# Patient Record
Sex: FEMALE | Race: WHITE | NOT HISPANIC OR LATINO | URBAN - METROPOLITAN AREA
[De-identification: names, ages, dates, MRNs, and addresses within clinical notes are randomized per-mention and may not be internally consistent; named-entity substitution may affect disease eponyms.]

---

## 2024-01-18 ENCOUNTER — APPOINTMENT (RX ONLY)
Dept: URBAN - METROPOLITAN AREA CLINIC 28 | Facility: CLINIC | Age: 74
Setting detail: DERMATOLOGY
End: 2024-01-18

## 2024-01-18 DIAGNOSIS — Z85.828 PERSONAL HISTORY OF OTHER MALIGNANT NEOPLASM OF SKIN: ICD-10-CM

## 2024-01-18 DIAGNOSIS — D22 MELANOCYTIC NEVI: ICD-10-CM

## 2024-01-18 DIAGNOSIS — L85.3 XEROSIS CUTIS: ICD-10-CM

## 2024-01-18 DIAGNOSIS — L72.8 OTHER FOLLICULAR CYSTS OF THE SKIN AND SUBCUTANEOUS TISSUE: ICD-10-CM

## 2024-01-18 DIAGNOSIS — L82.1 OTHER SEBORRHEIC KERATOSIS: ICD-10-CM

## 2024-01-18 DIAGNOSIS — L90.0 LICHEN SCLEROSUS ET ATROPHICUS: ICD-10-CM

## 2024-01-18 PROBLEM — D22.5 MELANOCYTIC NEVI OF TRUNK: Status: ACTIVE | Noted: 2024-01-18

## 2024-01-18 PROCEDURE — ? PRESCRIPTION

## 2024-01-18 PROCEDURE — 99213 OFFICE O/P EST LOW 20 MIN: CPT

## 2024-01-18 PROCEDURE — ? COUNSELING

## 2024-01-18 PROCEDURE — ? OBSERVATION

## 2024-01-18 RX ORDER — CLOBETASOL PROPIONATE 0.5 MG/G
OINTMENT TOPICAL
Qty: 30 | Refills: 0

## 2024-01-18 ASSESSMENT — LOCATION SIMPLE DESCRIPTION DERM
LOCATION SIMPLE: LEFT HAND
LOCATION SIMPLE: VESTIBULE
LOCATION SIMPLE: UPPER BACK
LOCATION SIMPLE: LEFT UPPER BACK

## 2024-01-18 ASSESSMENT — LOCATION DETAILED DESCRIPTION DERM
LOCATION DETAILED: VESTIBULE
LOCATION DETAILED: INFERIOR THORACIC SPINE
LOCATION DETAILED: LEFT SUPERIOR UPPER BACK
LOCATION DETAILED: LEFT MID-UPPER BACK
LOCATION DETAILED: LEFT ULNAR DORSAL HAND

## 2024-01-18 ASSESSMENT — LOCATION ZONE DERM
LOCATION ZONE: VAGINA
LOCATION ZONE: HAND
LOCATION ZONE: TRUNK

## 2024-01-18 NOTE — HPI: SECONDARY COMPLAINT
Additional History: Patient does not need a refill of clobetasol ointment .05%. Patient states she has plenty at home, and only uses it as needed. \\nPatient is noticing some bumps on the genitals.

## 2024-01-18 NOTE — PROCEDURE: OBSERVATION
Detail Level: Detailed
Size Of Lesion In Cm (Optional): 0.6
X Size Of Lesion In Cm (Optional): 0
Morphology Per Location (Optional): Stuck on, tan

## 2024-01-18 NOTE — PROCEDURE: COUNSELING
Patient Specific Counseling (Will Not Stick From Patient To Patient): Clinical diagnosis for patient.
Detail Level: Detailed
Size Of Lesion In Cm (Optional): 0.6
X Size Of Lesion In Cm (Optional): 0
Morphology Per Location (Optional): Stuck on, tan
Detail Level: Generalized
Detail Level: Zone

## 2024-07-17 ENCOUNTER — OFFICE VISIT (OUTPATIENT)
Dept: URBAN - METROPOLITAN AREA CLINIC 19 | Facility: CLINIC | Age: 74
End: 2024-07-17
Payer: MEDICARE

## 2024-07-17 ENCOUNTER — DASHBOARD ENCOUNTERS (OUTPATIENT)
Age: 74
End: 2024-07-17

## 2024-07-17 VITALS
HEART RATE: 99 BPM | WEIGHT: 131 LBS | DIASTOLIC BLOOD PRESSURE: 60 MMHG | BODY MASS INDEX: 21.05 KG/M2 | HEIGHT: 66 IN | TEMPERATURE: 98.6 F | SYSTOLIC BLOOD PRESSURE: 120 MMHG

## 2024-07-17 DIAGNOSIS — R10.13 EPIGASTRIC PAIN: ICD-10-CM

## 2024-07-17 DIAGNOSIS — Z12.11 SCREEN FOR COLON CANCER: ICD-10-CM

## 2024-07-17 DIAGNOSIS — K86.81 CONGENITAL DEFICIENCY OF PANCREATIC LIPASE: ICD-10-CM

## 2024-07-17 PROCEDURE — 99204 OFFICE O/P NEW MOD 45 MIN: CPT | Performed by: NURSE PRACTITIONER

## 2024-07-17 NOTE — PHYSICAL EXAM GASTROINTESTINAL
Abdomen , soft, nontender, nondistended , no guarding or rigidity , Liver and Spleen , no hepatosplenomegaly

## 2024-07-17 NOTE — HPI-TODAY'S VISIT:
74-year-old female with HLD, hypothyroidism, cholecystectomy, ovary removal presents for ER follow-up. Presented to [facility] on 7/3/2024 with epigastric pain, chest pain WBC 9.3, Hg 12.0, , caridac enzymes, troponins negative Lipase 146 elevated CMP unremarkable EKG normal CXR negative  CT A/P with contrast-no evidence of acute pancreatitis, cholecystectomy changes, duodenal diverticula. She states the pain came out of nowhere and had it 5 days, was concerned she was having a heart attack.   Was given Pantoprazole and Carafate She stopped both due to reading up on side effects, only took about a week.   No dysphagia, indigestion, nausea or vomiting. Has regular BMs. No bloody or black stools No family history of colon cancer. No NSAID use. Takes [place] aspirin PRN once/month for headache.   2/14/2012 colonoscopy by Dr. Estrada was normal  No family history of GI or colon cancer.  She denies alcohol use, non-smoker, no illicit drugs.

## 2024-07-22 ENCOUNTER — APPOINTMENT (RX ONLY)
Dept: URBAN - METROPOLITAN AREA CLINIC 28 | Facility: CLINIC | Age: 74
Setting detail: DERMATOLOGY
End: 2024-07-22

## 2024-07-22 ENCOUNTER — TELEPHONE ENCOUNTER (OUTPATIENT)
Dept: URBAN - METROPOLITAN AREA CLINIC 19 | Facility: CLINIC | Age: 74
End: 2024-07-22

## 2024-07-22 DIAGNOSIS — L82.1 OTHER SEBORRHEIC KERATOSIS: ICD-10-CM

## 2024-07-22 DIAGNOSIS — L90.0 LICHEN SCLEROSUS ET ATROPHICUS: ICD-10-CM | Status: STABLE

## 2024-07-22 DIAGNOSIS — D18.0 HEMANGIOMA: ICD-10-CM

## 2024-07-22 DIAGNOSIS — Z12.83 ENCOUNTER FOR SCREENING FOR MALIGNANT NEOPLASM OF SKIN: ICD-10-CM

## 2024-07-22 DIAGNOSIS — Z85.828 PERSONAL HISTORY OF OTHER MALIGNANT NEOPLASM OF SKIN: ICD-10-CM

## 2024-07-22 DIAGNOSIS — L72.8 OTHER FOLLICULAR CYSTS OF THE SKIN AND SUBCUTANEOUS TISSUE: ICD-10-CM

## 2024-07-22 PROBLEM — D18.01 HEMANGIOMA OF SKIN AND SUBCUTANEOUS TISSUE: Status: ACTIVE | Noted: 2024-07-22

## 2024-07-22 PROCEDURE — 99213 OFFICE O/P EST LOW 20 MIN: CPT

## 2024-07-22 PROCEDURE — ? COUNSELING

## 2024-07-22 PROCEDURE — ? PRESCRIPTION

## 2024-07-22 RX ORDER — CLOBETASOL PROPIONATE 0.5 MG/G
OINTMENT TOPICAL
Qty: 60 | Refills: 1 | Status: ERX

## 2024-07-22 ASSESSMENT — LOCATION ZONE DERM
LOCATION ZONE: TRUNK
LOCATION ZONE: LEG
LOCATION ZONE: VULVA
LOCATION ZONE: NECK

## 2024-07-22 ASSESSMENT — LOCATION SIMPLE DESCRIPTION DERM
LOCATION SIMPLE: RIGHT ANTERIOR NECK
LOCATION SIMPLE: RIGHT PRETIBIAL REGION
LOCATION SIMPLE: ABDOMEN
LOCATION SIMPLE: LEFT PRETIBIAL REGION
LOCATION SIMPLE: RIGHT UPPER BACK
LOCATION SIMPLE: VULVA

## 2024-07-22 ASSESSMENT — LOCATION DETAILED DESCRIPTION DERM
LOCATION DETAILED: LEFT LABIA MINORA
LOCATION DETAILED: LEFT PROXIMAL PRETIBIAL REGION
LOCATION DETAILED: RIGHT INFERIOR UPPER BACK
LOCATION DETAILED: RIGHT CLAVICULAR NECK
LOCATION DETAILED: RIGHT MEDIAL UPPER BACK
LOCATION DETAILED: RIGHT LABIA MINORA
LOCATION DETAILED: EPIGASTRIC SKIN
LOCATION DETAILED: RIGHT PROXIMAL PRETIBIAL REGION

## 2024-07-22 NOTE — HPI: OTHER
Condition:: Concerning spot
Please Describe Your Condition:: is an established patient who is being seen for a chief complaint of Concerning spot. Spot on right shoulder that seems to be getting darker recently? Tan and crusty and sometimes parts of it come off the top.

## 2024-07-22 NOTE — PROCEDURE: COUNSELING
Detail Level: Detailed
Patient Specific Counseling (Will Not Stick From Patient To Patient): -\\n\\nPatient is using the clobetasol ointment twice a week. She has no trouble with it. She is requesting refills for it
Detail Level: Zone
Patient Specific Counseling (Will Not Stick From Patient To Patient): --\\n\\nno unusual ones, do RTC with changes or unusual lump or bump
Skin Checks Recommendations: SSE q month\\nFBSE with derm q year
Detail Level: Generalized
Topical Retinoids Recommendations: Begin with otc Gold Johnson with Retinol to arms and legs at HS\\nConsider Rx Tretinoin, discussed
Nicotinamide Supplementation Recommendations: Continue 500mg BID

## 2024-07-23 LAB
A/G RATIO: 1.7
ALBUMIN: 4.5
ALKALINE PHOSPHATASE: 72
ALT (SGPT): 15
AST (SGOT): 22
BILIRUBIN, TOTAL: 0.5
BUN/CREATININE RATIO: (no result)
BUN: 23
CALCIUM: 9.5
CARBON DIOXIDE, TOTAL: 23
CHLORIDE: 103
CREATININE: 0.95
EGFR: 62
GLOBULIN, TOTAL: 2.7
GLUCOSE: 85
HEMATOCRIT: 36.2
HEMOGLOBIN: 12.3
IGG, SUBCLASS 4: 98.2
LIPASE: 75
MCH: 31.7
MCHC: 34
MCV: 93.3
MPV: 10.5
PLATELET COUNT: 224
POTASSIUM: 4.7
PROTEIN, TOTAL: 7.2
RDW: 12
RED BLOOD CELL COUNT: 3.88
SODIUM: 138
TRIGLYCERIDES: 138
WHITE BLOOD CELL COUNT: 7.5

## 2024-07-25 ENCOUNTER — TELEPHONE ENCOUNTER (OUTPATIENT)
Dept: URBAN - METROPOLITAN AREA CLINIC 19 | Facility: CLINIC | Age: 74
End: 2024-07-25

## 2024-08-05 ENCOUNTER — WEB ENCOUNTER (OUTPATIENT)
Dept: URBAN - METROPOLITAN AREA CLINIC 19 | Facility: CLINIC | Age: 74
End: 2024-08-05

## 2024-08-05 RX ORDER — SUCRALFATE 1 G/1
1 TABLET ON AN EMPTY STOMACH TABLET ORAL TWICE A DAY
Qty: 60 | Refills: 1 | OUTPATIENT
Start: 2024-08-06 | End: 2024-10-05

## 2024-08-10 ENCOUNTER — WEB ENCOUNTER (OUTPATIENT)
Dept: URBAN - METROPOLITAN AREA CLINIC 19 | Facility: CLINIC | Age: 74
End: 2024-08-10

## 2024-08-14 ENCOUNTER — OFFICE VISIT (OUTPATIENT)
Dept: URBAN - METROPOLITAN AREA SURGERY CENTER 31 | Facility: SURGERY CENTER | Age: 74
End: 2024-08-14

## 2024-08-19 ENCOUNTER — OFFICE VISIT (OUTPATIENT)
Dept: URBAN - METROPOLITAN AREA SURGERY CENTER 31 | Facility: SURGERY CENTER | Age: 74
End: 2024-08-19

## 2024-08-21 ENCOUNTER — WEB ENCOUNTER (OUTPATIENT)
Dept: URBAN - METROPOLITAN AREA CLINIC 19 | Facility: CLINIC | Age: 74
End: 2024-08-21

## 2024-09-03 ENCOUNTER — OFFICE VISIT (OUTPATIENT)
Dept: URBAN - METROPOLITAN AREA SURGERY CENTER 31 | Facility: SURGERY CENTER | Age: 74
End: 2024-09-03

## 2024-09-16 ENCOUNTER — OFFICE VISIT (OUTPATIENT)
Dept: URBAN - METROPOLITAN AREA CLINIC 19 | Facility: CLINIC | Age: 74
End: 2024-09-16

## 2024-10-02 ENCOUNTER — OFFICE VISIT (OUTPATIENT)
Dept: URBAN - METROPOLITAN AREA CLINIC 19 | Facility: CLINIC | Age: 74
End: 2024-10-02

## 2025-01-20 ENCOUNTER — APPOINTMENT (OUTPATIENT)
Dept: URBAN - METROPOLITAN AREA CLINIC 28 | Facility: CLINIC | Age: 75
Setting detail: DERMATOLOGY
End: 2025-01-20

## 2025-01-20 DIAGNOSIS — L90.0 LICHEN SCLEROSUS ET ATROPHICUS: ICD-10-CM | Status: STABLE

## 2025-01-20 DIAGNOSIS — D22 MELANOCYTIC NEVI: ICD-10-CM

## 2025-01-20 DIAGNOSIS — Z12.83 ENCOUNTER FOR SCREENING FOR MALIGNANT NEOPLASM OF SKIN: ICD-10-CM

## 2025-01-20 DIAGNOSIS — L72.8 OTHER FOLLICULAR CYSTS OF THE SKIN AND SUBCUTANEOUS TISSUE: ICD-10-CM

## 2025-01-20 DIAGNOSIS — L82.1 OTHER SEBORRHEIC KERATOSIS: ICD-10-CM

## 2025-01-20 DIAGNOSIS — L85.3 XEROSIS CUTIS: ICD-10-CM | Status: INADEQUATELY CONTROLLED

## 2025-01-20 DIAGNOSIS — Z85.828 PERSONAL HISTORY OF OTHER MALIGNANT NEOPLASM OF SKIN: ICD-10-CM

## 2025-01-20 PROBLEM — D22.5 MELANOCYTIC NEVI OF TRUNK: Status: ACTIVE | Noted: 2025-01-20

## 2025-01-20 PROCEDURE — ? PRESCRIPTION MEDICATION MANAGEMENT

## 2025-01-20 PROCEDURE — ? COUNSELING

## 2025-01-20 PROCEDURE — ? PRESCRIPTION

## 2025-01-20 PROCEDURE — 99214 OFFICE O/P EST MOD 30 MIN: CPT

## 2025-01-20 RX ORDER — CLOBETASOL PROPIONATE 0.5 MG/G
OINTMENT TOPICAL
Qty: 60 | Refills: 1 | Status: ERX

## 2025-01-20 ASSESSMENT — LOCATION SIMPLE DESCRIPTION DERM
LOCATION SIMPLE: VULVA
LOCATION SIMPLE: CHEST
LOCATION SIMPLE: RIGHT UPPER ARM
LOCATION SIMPLE: RIGHT CALF
LOCATION SIMPLE: RIGHT UPPER BACK
LOCATION SIMPLE: RIGHT PRETIBIAL REGION
LOCATION SIMPLE: LEFT ANKLE
LOCATION SIMPLE: LEFT UPPER BACK
LOCATION SIMPLE: LEFT PRETIBIAL REGION
LOCATION SIMPLE: LEFT UPPER ARM

## 2025-01-20 ASSESSMENT — LOCATION DETAILED DESCRIPTION DERM
LOCATION DETAILED: RIGHT ANTERIOR PROXIMAL UPPER ARM
LOCATION DETAILED: LEFT POSTERIOR ANKLE
LOCATION DETAILED: RIGHT DISTAL CALF
LOCATION DETAILED: RIGHT LABIA MINORA
LOCATION DETAILED: MIDDLE STERNUM
LOCATION DETAILED: RIGHT INFERIOR MEDIAL UPPER BACK
LOCATION DETAILED: LEFT PROXIMAL PRETIBIAL REGION
LOCATION DETAILED: RIGHT DISTAL PRETIBIAL REGION
LOCATION DETAILED: LEFT INFERIOR MEDIAL UPPER BACK
LOCATION DETAILED: RIGHT MEDIAL UPPER BACK
LOCATION DETAILED: LEFT LABIA MINORA
LOCATION DETAILED: LEFT ANTERIOR PROXIMAL UPPER ARM

## 2025-01-20 ASSESSMENT — LOCATION ZONE DERM
LOCATION ZONE: ARM
LOCATION ZONE: VULVA
LOCATION ZONE: TRUNK
LOCATION ZONE: LEG

## 2025-01-20 NOTE — PROCEDURE: COUNSELING
Detail Level: Generalized
Skin Checks Recommendations: SSE q month\\nFBSE with derm q 6 months due to LS
Detail Level: Detailed
Patient Specific Counseling (Will Not Stick From Patient To Patient): -\\n\\nPatient is using the clobetasol ointment twice a week. She has no trouble with it. She is requesting refills for it
Moisturizer Recommendations: Cetaphil cream at night \\nMoisturizer with SPF every morning, Cetaphil brand or Elta facial discussed (the patient likes Elta Sport already)
Patient Specific Counseling (Will Not Stick From Patient To Patient): -\\n\\nNo suspicious lesion or rash seen. Use good moisturizers like cerave cream or cetaphil cream. Return to clinic if rash occurs. Discussed widespread itching can sometimes be a sign of internal medical problems, but doesnt sound like that now, unless related to her low thyroid. Her itch is not intense and occurs at a random patch here or there which moves around. Keep us and PCP informed if pruritus worsens or rash occurs. 
Detail Level: Zone
Cleanser Recommendations: Dove soap, can continue
Patient Specific Counseling (Will Not Stick From Patient To Patient): --\\n\\nno unusual ones, do RTC with changes or unusual lump or bump

## 2025-01-20 NOTE — PROCEDURE: PRESCRIPTION MEDICATION MANAGEMENT
Continue Regimen: clobetasol 0.05 % topical ointment \\nQuantity: 60.0 g  Days Supply: 30\\nSig: Apply to affected areas use 2 separate nights a weeks
Detail Level: Zone
Render In Strict Bullet Format?: No

## 2025-01-20 NOTE — HPI: FULL BODY SKIN EXAMINATION
What Is The Reason For Today's Visit?: Full Body Skin Examination
What Is The Reason For Today's Visit? (Being Monitored For X): concerning skin lesions on a periodic basis
Additional History: Dr. Mcgill is her derm surgeon

## 2025-01-20 NOTE — HPI: OTHER
Condition:: Lichen sclerosus
Please Describe Your Condition:: is an established patient who is being seen for a chief complaint of Lichen sclerosus. 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 She is using clobetasol ointment twice a week. She does NOT see an obgyn. She would like refills of her clobetasol ointment. She has no genital symptoms, no itching or burning or discomfort. She knows she has some old cysts in the genital area, but nothing unusual or changing that she is aware of.
Condition:: Itchy skin
Please Describe Your Condition:: is an established patient who is being seen for a chief complaint of Itchy skin. Patient reports itchy skin on the torso or neck or arms for the last few months. She doesn’t notice a rash. When it occurs, it is just a focal itchiness. She uses cerave moisturizer itchy relief cream which helps a little. No known history of eczema. She does have low thyroid and is on medication for that.

## 2025-07-01 ENCOUNTER — APPOINTMENT (OUTPATIENT)
Dept: URBAN - METROPOLITAN AREA CLINIC 28 | Facility: CLINIC | Age: 75
Setting detail: DERMATOLOGY
End: 2025-07-01

## 2025-07-01 DIAGNOSIS — L60.1 ONYCHOLYSIS: ICD-10-CM | Status: STABLE

## 2025-07-01 PROCEDURE — ? PRESCRIPTION MEDICATION MANAGEMENT

## 2025-07-01 PROCEDURE — ? COUNSELING

## 2025-07-01 PROCEDURE — ? PHOTO-DOCUMENTATION

## 2025-07-01 ASSESSMENT — LOCATION DETAILED DESCRIPTION DERM
LOCATION DETAILED: LEFT THUMBNAIL
LOCATION DETAILED: RIGHT THUMBNAIL

## 2025-07-01 ASSESSMENT — LOCATION ZONE DERM: LOCATION ZONE: FINGERNAIL

## 2025-07-01 ASSESSMENT — LOCATION SIMPLE DESCRIPTION DERM
LOCATION SIMPLE: RIGHT THUMBNAIL
LOCATION SIMPLE: LEFT THUMBNAIL

## 2025-07-01 NOTE — HPI: OTHER
Condition:: Spot on thumb nails
Please Describe Your Condition:: is an established patient who is being seen for a chief complaint of Spot on thumb nails. Pt reports of left and right thumb nails being yellow striped, weird in color. \\n\\n\\nMarch 18: bitten by deer tick on back, urgent care prescribed doxycycline 100mg, it made pt’s face and hands get sun burned easily….per patient the dorsum of her hands and fingers got a severe red sunburn. \\n\\nMarch 24: developed a tingling sensation and coldness in both hands. \\n\\nApril 11-24: pain in both thumbnails, nails became pink and yellow vertical lines, increased sensitivity at nail bed bed \\n\\nLate April/June: white/yellow areas more prominent now. \\n\\nThis morning, dark brown underneath left nail bed in the distal yellow area, pt believes it’s due to being in the pool yesterday but unsure, but now the dark red brown of the left distal thumbnails looks better to her.\\n\\nShe has been off of the Doxycycline for weeks now.

## 2025-07-01 NOTE — PROCEDURE: COUNSELING
Patient Specific Counseling (Will Not Stick From Patient To Patient): —\\n\\nPer pt: \\n\\nMarch 18: bitten by deer tick on back, urgent care prescribed doxycycline 100mg, it made pt’s face and hands get sun burned easily and hand tingling\\n\\nWeeks later, pain in both thumbnails, nails became pink and yellow vertical lines, increased sensitivity at nail bed bed \\n\\nLate April/June: white/yellow lines re more prominent now. \\n\\nThis morning 7/1/25: dark brown underneath left nail bed, pt believes it’s due to being in the pool yesterday but unsure.\\n\\nDiscussed this is most likely photoonycholysis/ be due to doxycycline 100mg and the severe sunburn she got on it, After evaluating today, recommend keeping hands dry for a while to try to prevent secondary infection from moving in, hopefully the nail bed will reattach itself. Recommended keeping trimmed nails short, staying out of nail salon (she doesn't go to nail salons), keep hands dry, no vigorous cleaning under the thumb nails, careful about fulcrum effect, no cuticle manipulation (patient asked about).\\n\\nBleeding happened under left thumb nail due to the pressure of un-attachment and disconnection from nail to bed caused some minor bleeding under the left distal nail in the area of onycholysis-reassurance about that and preventing more trauma.\\n\\nF/u end of July with FBE-if worsens or signs of secondary infection moving in-discussed clippings for culture or PAS and topical or oral medications-patient wants to wait and watch
Detail Level: Detailed

## 2025-07-01 NOTE — PROCEDURE: PRESCRIPTION MEDICATION MANAGEMENT
Defer Treatment (Provide Reason For Deferment In Text Field Below): patient agrees with no medications for now and watching to see if any signs of infection move in. Discussed how yeast, fungus and bacteria can look under the nail, but history is very c/w photoonycholysis from Doxy and the problem may correct itself
Render In Strict Bullet Format?: No
Detail Level: Zone

## 2025-07-28 ENCOUNTER — RX ONLY (RX ONLY)
Age: 75
End: 2025-07-28

## 2025-07-28 ENCOUNTER — APPOINTMENT (OUTPATIENT)
Dept: URBAN - METROPOLITAN AREA CLINIC 28 | Facility: CLINIC | Age: 75
Setting detail: DERMATOLOGY
End: 2025-07-28

## 2025-07-28 DIAGNOSIS — Z85.828 PERSONAL HISTORY OF OTHER MALIGNANT NEOPLASM OF SKIN: ICD-10-CM

## 2025-07-28 DIAGNOSIS — L57.0 ACTINIC KERATOSIS: ICD-10-CM

## 2025-07-28 DIAGNOSIS — L90.0 LICHEN SCLEROSUS ET ATROPHICUS: ICD-10-CM | Status: STABLE

## 2025-07-28 DIAGNOSIS — L60.1 ONYCHOLYSIS: ICD-10-CM | Status: IMPROVED

## 2025-07-28 DIAGNOSIS — D22 MELANOCYTIC NEVI: ICD-10-CM

## 2025-07-28 DIAGNOSIS — Z12.83 ENCOUNTER FOR SCREENING FOR MALIGNANT NEOPLASM OF SKIN: ICD-10-CM

## 2025-07-28 DIAGNOSIS — L56.4 POLYMORPHOUS LIGHT ERUPTION: ICD-10-CM | Status: RESOLVING

## 2025-07-28 DIAGNOSIS — L82.1 OTHER SEBORRHEIC KERATOSIS: ICD-10-CM

## 2025-07-28 PROBLEM — D22.5 MELANOCYTIC NEVI OF TRUNK: Status: ACTIVE | Noted: 2025-07-28

## 2025-07-28 PROCEDURE — ? LIQUID NITROGEN

## 2025-07-28 PROCEDURE — ? PRESCRIPTION

## 2025-07-28 PROCEDURE — ? ADDITIONAL NOTES

## 2025-07-28 PROCEDURE — ? PHOTO-DOCUMENTATION

## 2025-07-28 PROCEDURE — ? PRESCRIPTION MEDICATION MANAGEMENT

## 2025-07-28 PROCEDURE — ? COUNSELING

## 2025-07-28 RX ORDER — CLOBETASOL PROPIONATE 0.5 MG/G
OINTMENT TOPICAL
Qty: 30 | Refills: 1

## 2025-07-28 ASSESSMENT — LOCATION DETAILED DESCRIPTION DERM
LOCATION DETAILED: MIDDLE STERNUM
LOCATION DETAILED: RIGHT INFERIOR MEDIAL UPPER BACK
LOCATION DETAILED: LEFT NASAL ALA
LOCATION DETAILED: LEFT INFERIOR MEDIAL MALAR CHEEK
LOCATION DETAILED: RIGHT MEDIAL UPPER BACK
LOCATION DETAILED: LEFT THUMBNAIL
LOCATION DETAILED: RIGHT THUMBNAIL
LOCATION DETAILED: UPPER STERNUM
LOCATION DETAILED: LEFT MEDIAL MALAR CHEEK

## 2025-07-28 ASSESSMENT — LOCATION SIMPLE DESCRIPTION DERM
LOCATION SIMPLE: RIGHT THUMBNAIL
LOCATION SIMPLE: LEFT CHEEK
LOCATION SIMPLE: CHEST
LOCATION SIMPLE: RIGHT UPPER BACK
LOCATION SIMPLE: LEFT NOSE
LOCATION SIMPLE: LEFT THUMBNAIL

## 2025-07-28 ASSESSMENT — LOCATION ZONE DERM
LOCATION ZONE: FACE
LOCATION ZONE: NOSE
LOCATION ZONE: FINGERNAIL
LOCATION ZONE: TRUNK

## 2025-07-28 NOTE — PROCEDURE: ADDITIONAL NOTES
Render Risk Assessment In Note?: no
Detail Level: Simple
Additional Notes: discussed bx vs cryotherapy-patient agrees with cryo today, recheck in several weeks , but bx any treatment resistant or recurrent spot. R/B of bx vs cryo discussed.

## 2025-07-28 NOTE — HPI: OTHER
Condition:: AKs
Please Describe Your Condition:: The patient is recently noticing a small flat rough spot on the left ala of the nose and near the left crease where the nose meets the cheek.
Condition:: PMLE
Please Describe Your Condition:: The patient accidentally got too much sun on the V of her chest and it didn't sunburn, but the area got an itchy little bumpy rash where the sun occurred. It is improving being out of the sun. She doesn't recall prior such episodes. No treatments.

## 2025-07-28 NOTE — HPI: FULL BODY SKIN EXAMINATION
What Is The Reason For Today's Visit?: Full Body Skin Examination
What Is The Reason For Today's Visit? (Being Monitored For X): concerning skin lesions on a periodic basis
Additional History: 6 mo FBE\\n\\nAK ( cryotherapy)\\nH/ O SCC

## 2025-07-28 NOTE — PROCEDURE: PRESCRIPTION MEDICATION MANAGEMENT
Continue Regimen: clobetasol 0.05 % topical ointment \\nQuantity: 30.0 g  Days Supply: 30\\nSig: Apply to affected areas use 2 separate nights a weeks
Detail Level: Zone
Render In Strict Bullet Format?: No
Defer Treatment (Provide Reason For Deferment In Text Field Below): patient agrees with no medications for now and watching to see if any signs of infection move in. Discussed how yeast, fungus and bacteria can look under the nail, but history is very c/w photoonycholysis from Doxy and the problem may correct itself

## 2025-07-28 NOTE — PROCEDURE: COUNSELING
Detail Level: Generalized
Skin Checks Recommendations: SSE q month\\nFBSE with derm q 6 months due to LS
Detail Level: Detailed
Patient Specific Counseling (Will Not Stick From Patient To Patient): -\\n\\nPatient is using the clobetasol ointment twice a week. She has no trouble with it and is stable. Patient wanted a smaller tube than 60g so will send 30g
Patient Specific Counseling (Will Not Stick From Patient To Patient): —\\n\\nPer pt: \\n\\nMarch 18: bitten by deer tick on back, urgent care prescribed doxycycline 100mg, it made pt’s face and hands get sun burned easily and hand tingling\\n\\nWeeks later, pain in both thumbnails, nails became pink and yellow vertical lines, increased sensitivity at nail bed bed \\n\\nLate April/June: white/yellow lines re more prominent now. \\n\\n 7/1/25: dark brown underneath left nail bed, pt believes it’s due to being in the pool yesterday but unsure.\\n\\n7/28/25: yellow on nails improved and some bleeding in both thumb nails. Patient shares that she was in the garden so could be a possibility that there was damage. \\n \\nDiscussed this is most likely photoonycholysis/ be due to doxycycline 100mg and the severe sunburn she got on it, After evaluating today, recommend keeping hands dry for a while to try to prevent secondary infection from moving in, hopefully the nail bed will reattach itself. Recommended keeping trimmed nails short, staying out of nail salon (she doesn't go to nail salons), keep hands dry, no vigorous cleaning under the thumb nails, careful about fulcrum effect, no cuticle manipulation (patient asked about).\\n\\nBleeding happened under left thumb nail due to the pressure of un-attachment and disconnection from nail to bed caused some minor bleeding under the left distal nail in the area of onycholysis-reassurance about that and preventing more trauma.\\n\\nAdvised patient to watch thumb nails especially since infection is possible due to the pocket with nail and skin.\\n\\nimproved since last time, but slight set back after trauma to nails with yard work. Gloves discussed to protect from fulcrum effect. No signs of infection now-but risk of that \"moving in\" discussed and signs/sx to report to us. Trim distal loose free end as able/as it grows out. Only gentle care. Avoid trapped moisture under the nails.
Detail Level: Zone
Patient Specific Counseling (Will Not Stick From Patient To Patient): --\\n\\ncan use otc HC BID for 1-2 weeks, set aside as clears/not for long term use.\\nStrict SPF use or UPF clothes and sun avoidance. \\nThis occurred 3-4 mos after Doxy DC-so reassurance, not related to Doxy at this point
Nicotinamide Supplementation Recommendations: Continue 500mg BID
Topical Retinoids Recommendations: Begin with otc Gold Johnson with Retinol to arms and legs at HS\\nConsider Rx Tretinoin, discussed